# Patient Record
Sex: MALE | Race: WHITE | HISPANIC OR LATINO
[De-identification: names, ages, dates, MRNs, and addresses within clinical notes are randomized per-mention and may not be internally consistent; named-entity substitution may affect disease eponyms.]

---

## 2023-04-30 ENCOUNTER — FORM ENCOUNTER (OUTPATIENT)
Age: 53
End: 2023-04-30

## 2023-05-03 PROBLEM — Z00.00 ENCOUNTER FOR PREVENTIVE HEALTH EXAMINATION: Status: ACTIVE | Noted: 2023-05-03

## 2023-05-18 ENCOUNTER — APPOINTMENT (OUTPATIENT)
Dept: CARDIOLOGY | Facility: HOSPITAL | Age: 53
End: 2023-05-18
Payer: COMMERCIAL

## 2023-05-18 ENCOUNTER — OUTPATIENT (OUTPATIENT)
Dept: OUTPATIENT SERVICES | Facility: HOSPITAL | Age: 53
LOS: 1 days | End: 2023-05-18
Payer: SELF-PAY

## 2023-05-18 ENCOUNTER — NON-APPOINTMENT (OUTPATIENT)
Age: 53
End: 2023-05-18

## 2023-05-18 VITALS
SYSTOLIC BLOOD PRESSURE: 110 MMHG | OXYGEN SATURATION: 98 % | HEART RATE: 60 BPM | HEIGHT: 66 IN | DIASTOLIC BLOOD PRESSURE: 70 MMHG

## 2023-05-18 DIAGNOSIS — R07.89 OTHER CHEST PAIN: ICD-10-CM

## 2023-05-18 DIAGNOSIS — I25.10 ATHEROSCLEROTIC HEART DISEASE OF NATIVE CORONARY ARTERY WITHOUT ANGINA PECTORIS: ICD-10-CM

## 2023-05-18 PROCEDURE — 93000 ELECTROCARDIOGRAM COMPLETE: CPT

## 2023-05-18 PROCEDURE — G0463: CPT

## 2023-05-18 PROCEDURE — 99212 OFFICE O/P EST SF 10 MIN: CPT

## 2023-05-18 PROCEDURE — 93005 ELECTROCARDIOGRAM TRACING: CPT

## 2023-05-19 DIAGNOSIS — R07.89 OTHER CHEST PAIN: ICD-10-CM

## 2023-05-19 NOTE — REASON FOR VISIT
[Interpreters_IDNumber] : [id number] [TWNoteComboBox1] : [place] [FreeTextEntry1] : 53 Yo M with PMHX of ?MI 3 years ago in [facility] who presents to establish care\par \par #Pt reports that three years ago he went to the hospital in [facility] with acute chest burning and sweating, was found to have elevated markers in his blood that were concerning for MI, and was taken to the cath lab, and was told there was no obstruction, and thus no intervention was performed. Since that time, he can recall 3 episodes of a sharp (stitching) like sensation of pain on the left side of his chest that was non exertion, but cannot recall if positional, and states that his thinks it might have been related to MSK (last episode was two months ago) Otherwise, he has exercise tolerance of 0.5->1 mile for the past year, afterwhich he gets generally fatigued, but no chest pain, palpitations, shortness of breath, light headedness, or dizziness. His associated symptoms are numbness/tingling in his hands and feet intermittently and he gets dizzy if he stands up too quickly. he denies orthopnea, PND, WALKER, or LE edema. \par of note, he reports that his echo at the time he went to the hospital 3 years ago was abnormal, but his most recent echo 6 months ago was wnl. He will to to obtain all of the records. \par \par Meds\par carvedilol 6.25mg PO BID\par aspirin\par atorvastatin\par enalapril 10mg daily

## 2023-05-19 NOTE — ASSESSMENT
[FreeTextEntry1] : 51 Yo M with PMHX of ?MI 3 years ago in [facility] who presents to establish care

## 2023-05-19 NOTE — REVIEW OF SYSTEMS
[Feeling Fatigued] : feeling fatigued [Negative] : Heme/Lymph [FreeTextEntry2] : with exertion of 0.5-1 mile [FreeTextEntry5] : as above [FreeTextEntry6] : a

## 2023-06-08 LAB
ALBUMIN SERPL ELPH-MCNC: 4.8 G/DL
ALP BLD-CCNC: 95 U/L
ALT SERPL-CCNC: 20 U/L
ANION GAP SERPL CALC-SCNC: 12 MMOL/L
AST SERPL-CCNC: 19 U/L
BILIRUB SERPL-MCNC: 1.2 MG/DL
BUN SERPL-MCNC: 16 MG/DL
CALCIUM SERPL-MCNC: 9.3 MG/DL
CHLORIDE SERPL-SCNC: 105 MMOL/L
CHOLEST SERPL-MCNC: 175 MG/DL
CO2 SERPL-SCNC: 25 MMOL/L
CREAT SERPL-MCNC: 0.72 MG/DL
EGFR: 110 ML/MIN/1.73M2
ESTIMATED AVERAGE GLUCOSE: 117 MG/DL
GLUCOSE SERPL-MCNC: 99 MG/DL
HBA1C MFR BLD HPLC: 5.7 %
HDLC SERPL-MCNC: 45 MG/DL
LDLC SERPL CALC-MCNC: 109 MG/DL
NONHDLC SERPL-MCNC: 129 MG/DL
NT-PROBNP SERPL-MCNC: 71 PG/ML
POTASSIUM SERPL-SCNC: 4.5 MMOL/L
PROT SERPL-MCNC: 7 G/DL
SODIUM SERPL-SCNC: 141 MMOL/L
TRIGL SERPL-MCNC: 100 MG/DL
TSH SERPL-ACNC: 1.1 UIU/ML

## 2023-07-13 ENCOUNTER — APPOINTMENT (OUTPATIENT)
Dept: CARDIOLOGY | Facility: HOSPITAL | Age: 53
End: 2023-07-13

## 2023-07-13 ENCOUNTER — OUTPATIENT (OUTPATIENT)
Dept: OUTPATIENT SERVICES | Facility: HOSPITAL | Age: 53
LOS: 1 days | End: 2023-07-13
Payer: SELF-PAY

## 2023-07-13 ENCOUNTER — NON-APPOINTMENT (OUTPATIENT)
Age: 53
End: 2023-07-13

## 2023-07-13 VITALS
HEART RATE: 69 BPM | SYSTOLIC BLOOD PRESSURE: 117 MMHG | DIASTOLIC BLOOD PRESSURE: 81 MMHG | HEIGHT: 66 IN | OXYGEN SATURATION: 98 %

## 2023-07-13 DIAGNOSIS — I25.10 ATHEROSCLEROTIC HEART DISEASE OF NATIVE CORONARY ARTERY WITHOUT ANGINA PECTORIS: ICD-10-CM

## 2023-07-13 PROCEDURE — G0463: CPT

## 2023-07-13 PROCEDURE — 93005 ELECTROCARDIOGRAM TRACING: CPT

## 2023-07-13 NOTE — REASON FOR VISIT
[Interpreters_Relationshiptopatient] : juarez [TWNoteComboBox1] : English [FreeTextEntry1] : 52 Yo M with PMHX of ?MI 3 years ago in [facility] who presents for follow up.\par \par At last visit, his coreg was decreased from 6.25mg to 3.125mg with improvement in his symptoms of dizziness. he denies any chest pain, shortness of breath, orthopnea, PND, WALKER, or LE edema. \par Of note, he brought in his report of his coronary angiogram from his "MI" 3 years ago in [facility] that was in [place]ish, but a non- had translated to english. Per report:LM: no lesions. LAD: large caliber, no angiographic lesions. Diag Branches:  no angiographic lesions. CX: large artery with good caliber, without angiographic lesions. OM: large artery with good caliber, without angiographic lesions. RCA and PDA: without angiographic lesions. . TTE report with normal LV function. \par results from blood work reviewed with patient including pre-DM, plan to improve lifestyle including diet and exercise.

## 2023-07-14 DIAGNOSIS — E78.5 HYPERLIPIDEMIA, UNSPECIFIED: ICD-10-CM

## 2023-08-30 ENCOUNTER — OUTPATIENT (OUTPATIENT)
Dept: OUTPATIENT SERVICES | Facility: HOSPITAL | Age: 53
LOS: 1 days | End: 2023-08-30
Payer: SELF-PAY

## 2023-08-30 ENCOUNTER — APPOINTMENT (OUTPATIENT)
Dept: CV DIAGNOSITCS | Facility: HOSPITAL | Age: 53
End: 2023-08-30

## 2023-08-30 DIAGNOSIS — E78.5 HYPERLIPIDEMIA, UNSPECIFIED: ICD-10-CM

## 2023-09-01 PROCEDURE — 93356 MYOCRD STRAIN IMG SPCKL TRCK: CPT

## 2023-09-01 PROCEDURE — 93306 TTE W/DOPPLER COMPLETE: CPT | Mod: 26

## 2023-09-01 PROCEDURE — 93356 MYOCRD STRAIN IMG SPCKL TRCK: CPT | Mod: 26

## 2023-09-01 PROCEDURE — 93306 TTE W/DOPPLER COMPLETE: CPT

## 2023-09-06 ENCOUNTER — NON-APPOINTMENT (OUTPATIENT)
Age: 53
End: 2023-09-06

## 2023-09-13 ENCOUNTER — NON-APPOINTMENT (OUTPATIENT)
Age: 53
End: 2023-09-13

## 2023-10-19 ENCOUNTER — OUTPATIENT (OUTPATIENT)
Dept: OUTPATIENT SERVICES | Facility: HOSPITAL | Age: 53
LOS: 1 days | End: 2023-10-19
Payer: SELF-PAY

## 2023-10-19 ENCOUNTER — NON-APPOINTMENT (OUTPATIENT)
Age: 53
End: 2023-10-19

## 2023-10-19 ENCOUNTER — APPOINTMENT (OUTPATIENT)
Dept: CARDIOLOGY | Facility: HOSPITAL | Age: 53
End: 2023-10-19

## 2023-10-19 VITALS — HEART RATE: 75 BPM | DIASTOLIC BLOOD PRESSURE: 74 MMHG | SYSTOLIC BLOOD PRESSURE: 111 MMHG | OXYGEN SATURATION: 98 %

## 2023-10-19 DIAGNOSIS — R73.03 PREDIABETES.: ICD-10-CM

## 2023-10-19 DIAGNOSIS — I25.10 ATHEROSCLEROTIC HEART DISEASE OF NATIVE CORONARY ARTERY WITHOUT ANGINA PECTORIS: ICD-10-CM

## 2023-10-19 DIAGNOSIS — E78.5 HYPERLIPIDEMIA, UNSPECIFIED: ICD-10-CM

## 2023-10-19 DIAGNOSIS — I10 ESSENTIAL (PRIMARY) HYPERTENSION: ICD-10-CM

## 2023-10-19 PROCEDURE — 93005 ELECTROCARDIOGRAM TRACING: CPT

## 2023-10-19 PROCEDURE — G0463: CPT

## 2023-10-19 RX ORDER — CARVEDILOL 3.12 MG/1
3.12 TABLET, FILM COATED ORAL
Qty: 60 | Refills: 2 | Status: DISCONTINUED | COMMUNITY
Start: 2023-05-18 | End: 2023-10-19

## 2023-10-19 RX ORDER — ATORVASTATIN CALCIUM 40 MG/1
40 TABLET, FILM COATED ORAL
Qty: 90 | Refills: 3 | Status: ACTIVE | COMMUNITY
Start: 2023-07-13 | End: 1900-01-01

## 2023-10-20 DIAGNOSIS — I10 ESSENTIAL (PRIMARY) HYPERTENSION: ICD-10-CM

## 2023-10-20 DIAGNOSIS — E78.5 HYPERLIPIDEMIA, UNSPECIFIED: ICD-10-CM

## 2023-11-06 NOTE — ASSESSMENT
[FreeTextEntry1] : 51 Yo M with PMHX of ?MI 3 years ago in [facility] who presents for follow up.\par \par ?Prior MI VS ?CAD Vs neither \par  Per report: Angiogram from [facility] at the time of his "MI" 3 years ago: LM: no lesions. LAD: large caliber, no angiographic lesions. Diag Branches:  no angiographic lesions. CX: large artery with good caliber, without angiographic lesions. OM: large artery with good caliber, without angiographic lesions. RCA and PDA: without angiographic lesions. \par -EKG now sinus rhythm. \par -stop coreg. \par -can continue aspirin for now\par -can continue with enalapril for now. \par -can continue with statin as below\par -Patient to try to obtain angiogram films for review. \par -Pre-DM discussed. f/u PMD\par \par \par #HLD\par -If patient dose have CAD (not reported on angiogram from [facility], but he was still sent home on aspirin and statin_\par -ZOW=[id number]. \par -Increase atorvastatin to 40mg daily. \par -Repeat LDL in 3 months. \par -Asked the patient to try to obtain the angiogram films for review. If he does not have CAD, he does not have to be on a statin. his 10 year ASVD in 3-4%. \par \par RTC after TTE (scheduled for 8/30/20223) [employer] Pregnancy And Lactation Text: This medication is Pregnancy Category B and is considered safe. There is some data to suggest avoiding during the third trimester. It is unknown if this medication is excreted in breast milk.

## 2023-11-30 LAB
CHOLEST SERPL-MCNC: 128 MG/DL
ESTIMATED AVERAGE GLUCOSE: 114 MG/DL
HBA1C MFR BLD HPLC: 5.6 %
HDLC SERPL-MCNC: 42 MG/DL
LDLC SERPL CALC-MCNC: 66 MG/DL
NONHDLC SERPL-MCNC: 86 MG/DL
TRIGL SERPL-MCNC: 109 MG/DL

## 2024-01-19 ENCOUNTER — RX RENEWAL (OUTPATIENT)
Age: 54
End: 2024-01-19

## 2024-02-06 ENCOUNTER — RX RENEWAL (OUTPATIENT)
Age: 54
End: 2024-02-06

## 2024-02-06 RX ORDER — ENALAPRIL MALEATE 10 MG/1
10 TABLET ORAL DAILY
Qty: 90 | Refills: 0 | Status: ACTIVE | COMMUNITY
Start: 2023-10-19 | End: 1900-01-01